# Patient Record
Sex: FEMALE | Race: WHITE | NOT HISPANIC OR LATINO | Employment: FULL TIME | URBAN - METROPOLITAN AREA
[De-identification: names, ages, dates, MRNs, and addresses within clinical notes are randomized per-mention and may not be internally consistent; named-entity substitution may affect disease eponyms.]

---

## 2023-07-07 ENCOUNTER — OFFICE VISIT (OUTPATIENT)
Dept: URGENT CARE | Facility: CLINIC | Age: 58
End: 2023-07-07
Payer: COMMERCIAL

## 2023-07-07 VITALS
HEIGHT: 60 IN | TEMPERATURE: 98 F | HEART RATE: 87 BPM | BODY MASS INDEX: 23.16 KG/M2 | SYSTOLIC BLOOD PRESSURE: 162 MMHG | OXYGEN SATURATION: 97 % | RESPIRATION RATE: 18 BRPM | DIASTOLIC BLOOD PRESSURE: 95 MMHG | WEIGHT: 118 LBS

## 2023-07-07 DIAGNOSIS — J32.9 BACTERIAL SINUSITIS: Primary | ICD-10-CM

## 2023-07-07 DIAGNOSIS — B96.89 BACTERIAL SINUSITIS: Primary | ICD-10-CM

## 2023-07-07 PROCEDURE — 99203 PR OFFICE/OUTPT VISIT, NEW, LEVL III, 30-44 MIN: ICD-10-PCS | Mod: S$GLB,,,

## 2023-07-07 PROCEDURE — 99203 OFFICE O/P NEW LOW 30 MIN: CPT | Mod: S$GLB,,,

## 2023-07-07 RX ORDER — ROSUVASTATIN CALCIUM 20 MG/1
20 TABLET, COATED ORAL
COMMUNITY
Start: 2023-06-19

## 2023-07-07 RX ORDER — INSULIN DEGLUDEC 100 U/ML
INJECTION, SOLUTION SUBCUTANEOUS
COMMUNITY
Start: 2023-06-16

## 2023-07-07 RX ORDER — OLMESARTAN MEDOXOMIL AND HYDROCHLOROTHIAZIDE 20/12.5 20; 12.5 MG/1; MG/1
1 TABLET ORAL
COMMUNITY
Start: 2023-06-28

## 2023-07-07 RX ORDER — MONTELUKAST SODIUM 10 MG/1
10 TABLET ORAL
COMMUNITY
Start: 2023-06-20

## 2023-07-07 RX ORDER — SPIRONOLACTONE 100 MG/1
TABLET, FILM COATED ORAL
COMMUNITY
Start: 2023-06-16

## 2023-07-07 RX ORDER — GLIMEPIRIDE 1 MG/1
1 TABLET ORAL EVERY MORNING
COMMUNITY
Start: 2023-06-19

## 2023-07-07 RX ORDER — PROMETHAZINE HYDROCHLORIDE AND DEXTROMETHORPHAN HYDROBROMIDE 6.25; 15 MG/5ML; MG/5ML
5 SYRUP ORAL NIGHTLY PRN
Qty: 118 ML | Refills: 0 | Status: SHIPPED | OUTPATIENT
Start: 2023-07-07

## 2023-07-07 RX ORDER — AMOXICILLIN AND CLAVULANATE POTASSIUM 875; 125 MG/1; MG/1
1 TABLET, FILM COATED ORAL EVERY 12 HOURS
Qty: 14 TABLET | Refills: 0 | Status: SHIPPED | OUTPATIENT
Start: 2023-07-07 | End: 2023-07-14

## 2023-07-07 RX ORDER — GLIMEPIRIDE 4 MG/1
4 TABLET ORAL
COMMUNITY
Start: 2023-06-20

## 2023-07-07 RX ORDER — SARECYCLINE HYDROCHLORIDE 60 MG/1
1 TABLET, COATED ORAL
COMMUNITY
Start: 2023-07-06

## 2023-07-07 RX ORDER — PEN NEEDLE, DIABETIC 32GX 5/32"
NEEDLE, DISPOSABLE MISCELLANEOUS
COMMUNITY
Start: 2023-03-02

## 2023-07-07 RX ORDER — METFORMIN HYDROCHLORIDE 1000 MG/1
1000 TABLET ORAL 2 TIMES DAILY
COMMUNITY
Start: 2023-06-19

## 2023-07-08 NOTE — PROGRESS NOTES
Subjective:      Patient ID: Karlene Hays is a 58 y.o. female.    Vitals:  height is 5' (1.524 m) and weight is 53.5 kg (118 lb). Her oral temperature is 98.4 °F (36.9 °C). Her blood pressure is 162/95 (abnormal) and her pulse is 87. Her respiration is 18 and oxygen saturation is 97%.     Chief Complaint: Sinus Problem    This is a 58 y.o. female with hx HTN and DM who presents today with a chief complaint of worsening sinus pressure. Patient started having symptoms three days ago. No fevers. She tried dayquil without relief.       Sinus Problem  This is a new problem. The current episode started in the past 7 days. The problem has been gradually worsening since onset. There has been no fever. Her pain is at a severity of 5/10. Associated symptoms include congestion, ear pain, headaches, neck pain, sinus pressure and a sore throat. Pertinent negatives include no chills, coughing or shortness of breath. (Ear fullness) Treatments tried: day quil. The treatment provided no relief.     Constitution: Negative for chills, fatigue and fever.   HENT:  Positive for ear pain, congestion, postnasal drip, sinus pressure and sore throat.    Neck: Positive for neck pain.   Respiratory:  Negative for cough and shortness of breath.    Gastrointestinal:  Negative for abdominal pain, nausea, vomiting and diarrhea.   Musculoskeletal:  Negative for muscle ache.   Neurological:  Positive for headaches.    Objective:     Physical Exam   Constitutional: She is oriented to person, place, and time. She appears well-developed. She is cooperative.  Non-toxic appearance. She does not appear ill. No distress.   HENT:   Head: Normocephalic and atraumatic.   Ears:   Right Ear: Hearing, tympanic membrane, external ear and ear canal normal.   Left Ear: Hearing, tympanic membrane, external ear and ear canal normal.   Nose: Congestion present. No mucosal edema, rhinorrhea or nasal deformity. No epistaxis. Right sinus exhibits frontal sinus  tenderness. Right sinus exhibits no maxillary sinus tenderness. Left sinus exhibits maxillary sinus tenderness and frontal sinus tenderness.   Mouth/Throat: Uvula is midline and mucous membranes are normal. No trismus in the jaw. Normal dentition. No uvula swelling. Posterior oropharyngeal erythema present. No oropharyngeal exudate or posterior oropharyngeal edema.   Eyes: Conjunctivae and lids are normal. Pupils are equal, round, and reactive to light. No scleral icterus.   Neck: Trachea normal and phonation normal. Neck supple. No edema present. No erythema present. No neck rigidity present.   Cardiovascular: Normal rate, regular rhythm, normal heart sounds and normal pulses.   Pulmonary/Chest: Effort normal and breath sounds normal. No stridor. No respiratory distress. She has no decreased breath sounds. She has no wheezes. She has no rhonchi. She has no rales.   Abdominal: Normal appearance.   Musculoskeletal: Normal range of motion.         General: No deformity. Normal range of motion.   Lymphadenopathy:     She has cervical adenopathy.   Neurological: She is alert and oriented to person, place, and time. She exhibits normal muscle tone. Coordination normal.   Skin: Skin is warm, dry, intact, not diaphoretic and not pale.   Psychiatric: Her speech is normal and behavior is normal. Judgment and thought content normal.   Nursing note and vitals reviewed.    Assessment:     1. Bacterial sinusitis        Plan:       Bacterial sinusitis  -     amoxicillin-clavulanate 875-125mg (AUGMENTIN) 875-125 mg per tablet; Take 1 tablet by mouth every 12 (twelve) hours. for 7 days  Dispense: 14 tablet; Refill: 0  -     promethazine-dextromethorphan (PROMETHAZINE-DM) 6.25-15 mg/5 mL Syrp; Take 5 mLs by mouth nightly as needed (cough).  Dispense: 118 mL; Refill: 0            Discussed results/diagnosis/plan with patient in clinic. Strict precautions given to patient to monitor for worsening signs and symptoms. Advised to follow  up with PCP or specialist.  Explained side effects of medications prescribed with patient and informed him/her to discontinue use if he/she has any side effects and to inform UC or PCP if this occurs. All questions answered. Strict ED verses clinic return precautions stressed and given in depth. Advised if symptoms worsens of fail to improve he/she should go to the Emergency Room. Discharge and follow-up instructions given verbally/printed with the patient who expressed understanding and willingness to comply with my recommendations. Patient voiced understanding and in agreement with current treatment plan. Patient exits the exam room in no acute distress. Conversant and engaged during discharge discussion, verbalized understanding.

## 2023-07-08 NOTE — PATIENT INSTRUCTIONS
Your symptoms are likely from a virus or allergen at this time.    When sick with a viral illness, cover your mouth and nose when sneezing and coughing. Wash hands frequently. Sanitize areas at home. Wear a mask in public if you need. Stay home if you are having fevers, chills, body aches, fatigue. Symptoms should resolve in 7-14 days. There is no specific treatment for viral illnesses. We treat symptoms with supportive care. Getting plenty of rest but completing light activities daily, eating a well-balanced diet, drinking plenty of fluids, managing stress levels can aid in faster recovery.      Take promethazine/DM cough syrup at night for cough. Promethazine is an oral anti-histamine and will help with sinus relief. DM (dextromethorphan) is a decongestant. This medication will make you drowsy. Make sure you do not drive a vehicle, drink alcohol, operate machinery or take other sedating medications while taking.     Try a decongestant and corticosteroid nasal spray like flonase for the next few days for sinus relief. Initial: 2 sprays in each nostril once daily for 1 week. Reduce to 1 spray in each nostril once per day. Stop taking if you develop a nose bleed. Nasal saline spray can be used together with flonase to help moisten nostrils. An antihistamine like zyrtec, claritin, allegra can also be helpful for sinus relief and will help dry nasal passages.     Regular (Guaifenesin) Mucinex 1200 mg twice per day for 10 days can help thin secretions for better clearance. Drink plenty of fluids with this.     Honey is a natural cough suppressant.     Warm tea/warm liquids will help soothe the back of your throat. Warm water salt gurgles can also be helpful. A dry throat will cause pain. Make sure to stay hydrated. Water and pedilyte are the best to drink. Neti pot irrigation, humidifier in your room, avoiding fans, warm compresses to face, eating/drinking hot soups, hot shower before bedtime can help.     -------if not  better after 7-10 days of sinus pain, start augmentin antibiotic for bacterial sinusitis. Take full dose or symptoms will not get better. Take with food and water to decrease GI upset. Try a probiotic supplement or eat daily yogurt to maintain good gut and vaginal domitila while on an antibiotic. Do not drink alcohol while taking an antibiotic.      The recommended daily fluid intake for women is 2.7 liters (five 16 oz bottles).      Alternate Tylenol and ibuprofen every 4 hours as needed for fever and body aches.  Please take NSAIDs with a full glass of water and food to avoid GI upset.      Please only use over the counter cough and cold medications for 3-5 days at a time to avoid rebound symptoms.     Getting plenty of rest can aid in a faster recovery of illnesses.     Avoid OTC cough and cold medications that can raise your BP such as dextromethorphan, pseudoephedrine, phenylephrine, naphazoline and oxymetazoline. Try DASH diet and buy a blood pressure cuff for home monitoring. Check blood pressure at least 2 times per day and create a log. Avoid eating foods that are high in salt. Eat more foods with potassium, magnesium and calcium which will help dilate your vessels and decrease your BP.     Please follow-up with your primary care provider or return to the clinic if not better/worsening symptoms in 1 week.     Report to the ER if you have chest pain, shortness of breath, palpitations.

## 2024-03-09 ENCOUNTER — OFFICE VISIT (OUTPATIENT)
Dept: URGENT CARE | Facility: CLINIC | Age: 59
End: 2024-03-09
Payer: COMMERCIAL

## 2024-03-09 VITALS
BODY MASS INDEX: 23.16 KG/M2 | DIASTOLIC BLOOD PRESSURE: 64 MMHG | HEIGHT: 60 IN | OXYGEN SATURATION: 97 % | TEMPERATURE: 98 F | WEIGHT: 118 LBS | RESPIRATION RATE: 16 BRPM | SYSTOLIC BLOOD PRESSURE: 138 MMHG | HEART RATE: 110 BPM

## 2024-03-09 DIAGNOSIS — M79.671 RIGHT FOOT PAIN: Primary | ICD-10-CM

## 2024-03-09 PROCEDURE — 73630 X-RAY EXAM OF FOOT: CPT | Mod: RT,S$GLB,, | Performed by: RADIOLOGY

## 2024-03-09 PROCEDURE — 99213 OFFICE O/P EST LOW 20 MIN: CPT | Mod: S$GLB,,, | Performed by: FAMILY MEDICINE

## 2024-03-09 RX ORDER — MELOXICAM 15 MG/1
15 TABLET ORAL DAILY
Qty: 7 TABLET | Refills: 0 | Status: SHIPPED | OUTPATIENT
Start: 2024-03-09 | End: 2024-03-16

## 2024-03-09 NOTE — PROGRESS NOTES
Subjective:      Patient ID: Karlene Hays is a 58 y.o. female.    Vitals:  height is 5' (1.524 m) and weight is 53.5 kg (118 lb). Her oral temperature is 98.1 °F (36.7 °C). Her blood pressure is 138/64 and her pulse is 110. Her respiration is 16 and oxygen saturation is 97%.     Chief Complaint: Foot Pain (Right Foot)    Patient reports right foot pain started Friday with no known injury.Patient took Asprin for pain.Patient reports Blood sugar was 81 this morning.    Foot Pain  This is a new problem. The current episode started in the past 7 days (Friday). The problem occurs constantly. The problem has been gradually worsening. Pertinent negatives include no joint swelling. Associated symptoms comments: Foot swelling. The symptoms are aggravated by walking and exertion. Treatments tried: asprin. The treatment provided no relief.       Musculoskeletal:  Positive for pain. Negative for joint swelling and abnormal ROM of joint.   Skin:  Negative for color change.      Objective:     Vitals:    03/09/24 1714   BP: 138/64   BP Location: Left arm   Patient Position: Sitting   BP Method: Medium (Automatic)   Pulse: 110   Resp: 16   Temp: 98.1 °F (36.7 °C)   TempSrc: Oral   SpO2: 97%   Weight: 53.5 kg (118 lb)   Height: 5' (1.524 m)      Physical Exam   Musculoskeletal: Normal range of motion.         General: Tenderness (right plantar arch) present. No swelling, deformity or signs of injury. Normal range of motion.      Comments: Peripheral pulses intact. No open wounds. No sensory deficit.   Neurological: She is alert. No sensory deficit. Gait (antalgic) abnormal.   Psychiatric: Thought content normal.         Assessment:     1. Right foot pain        Plan:       Right foot pain  Differentials: neuropathy, plantar fasciitis, sprain, gout, arthritis  -     XR FOOT COMPLETE 3 VIEW RIGHT; Future; Expected date: 03/09/2024  -     meloxicam (MOBIC) 15 MG tablet; Take 1 tablet (15 mg total) by mouth once daily. Take with  meals for 7 days  Dispense: 7 tablet; Refill: 0  -     NON-PNEUMATIC WALKING BOOT FOR HOME USE  -     Ambulatory referral/consult to Podiatry    Patient Instructions   I have placed podiatry referral  Call to schedule an appointment: [phone]